# Patient Record
Sex: MALE | Race: WHITE | NOT HISPANIC OR LATINO | URBAN - METROPOLITAN AREA
[De-identification: names, ages, dates, MRNs, and addresses within clinical notes are randomized per-mention and may not be internally consistent; named-entity substitution may affect disease eponyms.]

---

## 2018-08-20 ENCOUNTER — IMPORTED ENCOUNTER (OUTPATIENT)
Dept: URBAN - METROPOLITAN AREA CLINIC 50 | Facility: CLINIC | Age: 53
End: 2018-08-20

## 2019-10-04 ENCOUNTER — IMPORTED ENCOUNTER (OUTPATIENT)
Dept: URBAN - METROPOLITAN AREA CLINIC 50 | Facility: CLINIC | Age: 54
End: 2019-10-04

## 2020-10-15 ENCOUNTER — IMPORTED ENCOUNTER (OUTPATIENT)
Dept: URBAN - METROPOLITAN AREA CLINIC 50 | Facility: CLINIC | Age: 55
End: 2020-10-15

## 2021-04-06 ENCOUNTER — APPOINTMENT (RX ONLY)
Dept: URBAN - METROPOLITAN AREA CLINIC 86 | Facility: CLINIC | Age: 56
Setting detail: DERMATOLOGY
End: 2021-04-06

## 2021-04-06 DIAGNOSIS — L82.1 OTHER SEBORRHEIC KERATOSIS: ICD-10-CM

## 2021-04-06 DIAGNOSIS — L73.8 OTHER SPECIFIED FOLLICULAR DISORDERS: ICD-10-CM

## 2021-04-06 DIAGNOSIS — L57.3 POIKILODERMA OF CIVATTE: ICD-10-CM

## 2021-04-06 PROCEDURE — 99203 OFFICE O/P NEW LOW 30 MIN: CPT

## 2021-04-06 PROCEDURE — ? COUNSELING

## 2021-04-06 ASSESSMENT — LOCATION SIMPLE DESCRIPTION DERM
LOCATION SIMPLE: RIGHT CHEEK
LOCATION SIMPLE: POSTERIOR NECK
LOCATION SIMPLE: CHEST
LOCATION SIMPLE: NECK

## 2021-04-06 ASSESSMENT — LOCATION DETAILED DESCRIPTION DERM
LOCATION DETAILED: RIGHT SUPERIOR CENTRAL MALAR CHEEK
LOCATION DETAILED: RIGHT CENTRAL POSTERIOR NECK
LOCATION DETAILED: MID POSTERIOR NECK
LOCATION DETAILED: LEFT CENTRAL POSTERIOR NECK
LOCATION DETAILED: LEFT MEDIAL SUPERIOR CHEST

## 2021-04-06 ASSESSMENT — LOCATION ZONE DERM
LOCATION ZONE: FACE
LOCATION ZONE: TRUNK
LOCATION ZONE: NECK

## 2021-05-14 ASSESSMENT — VISUAL ACUITY
OD_BAT: 20/20
OS_PH: @ 16 IN
OS_CC: J1+
OS_CC: J1+
OD_CC: J1+@ 16 IN
OS_SC: 20/15
OS_SC: 20/20
OD_CC: J1+
OD_CC: J1+
OD_OTHER: 20/20. 20/30.
OS_BAT: 20/20
OD_SC: 20/20-1
OD_SC: 20/20-1
OS_CC: J1+@ 16 IN
OS_CC: J1+
OD_PH: @ 16 IN
OD_CC: J1+
OS_SC: 20/20-1
OD_SC: 20/25
OS_OTHER: 20/20. 20/30.

## 2021-05-14 ASSESSMENT — TONOMETRY
OD_IOP_MMHG: 17
OD_IOP_MMHG: 16
OD_IOP_MMHG: 12
OS_IOP_MMHG: 12
OS_IOP_MMHG: 16
OS_IOP_MMHG: 17

## 2021-09-22 NOTE — PATIENT DISCUSSION
Mild. Advised patient that she only needs one type of artificial tear and to try Oasis Tears Plus 2x/day. Gave patient a sample of Oasis Tears Plus.

## 2021-09-22 NOTE — PATIENT DISCUSSION
Candidate up to advanced(Synergy tgt Kyara) pending LASIK history and repeat biometry in [facility].

## 2021-10-18 ENCOUNTER — PREPPED CHART (OUTPATIENT)
Dept: URBAN - METROPOLITAN AREA CLINIC 50 | Facility: CLINIC | Age: 56
End: 2021-10-18

## 2021-10-21 ENCOUNTER — ANNUAL COMPREHENSIVE EXAM (OUTPATIENT)
Dept: URBAN - METROPOLITAN AREA CLINIC 50 | Facility: CLINIC | Age: 56
End: 2021-10-21

## 2021-10-21 DIAGNOSIS — H25.811: ICD-10-CM

## 2021-10-21 DIAGNOSIS — H43.813: ICD-10-CM

## 2021-10-21 DIAGNOSIS — H52.4: ICD-10-CM

## 2021-10-21 DIAGNOSIS — H25.12: ICD-10-CM

## 2021-10-21 DIAGNOSIS — H11.153: ICD-10-CM

## 2021-10-21 PROCEDURE — 92014 COMPRE OPH EXAM EST PT 1/>: CPT

## 2021-10-21 PROCEDURE — 92015 DETERMINE REFRACTIVE STATE: CPT

## 2021-10-21 ASSESSMENT — VISUAL ACUITY
OD_GLARE: 20/25
OD_GLARE: 20/30
OS_SC: 20/20
OS_GLARE: 20/30
OD_CC: J1+
OS_CC: J1+
OS_GLARE: 20/25
OU_SC: 20/20
OD_SC: 20/25
OU_CC: J1+
OD_PH: 20/20

## 2021-10-21 ASSESSMENT — KERATOMETRY
OD_K1POWER_DIOPTERS: 43.50
OD_AXISANGLE2_DEGREES: 113
OS_AXISANGLE2_DEGREES: 59
OS_AXISANGLE_DEGREES: 149
OD_K2POWER_DIOPTERS: 44.00
OS_K1POWER_DIOPTERS: 43.00
OD_AXISANGLE_DEGREES: 23
OS_K2POWER_DIOPTERS: 43.75

## 2021-10-21 ASSESSMENT — TONOMETRY
OS_IOP_MMHG: 14
OD_IOP_MMHG: 14

## 2021-10-26 NOTE — PATIENT DISCUSSION
Candidate up to advanced(Synergy tgt Kyara) pending LASIK history and repeat biometry in [facility].

## 2021-10-26 NOTE — PATIENT DISCUSSION
[place] [id number] records form [facility](?) Dr. Carmina Quintero Fax #[phone].

## 2021-12-10 NOTE — PATIENT DISCUSSION
September 18, 2019        Re: Alden Wall  [address] [place] [id number]      To whom it may concern:    Ald[facility] Wall [facility] [place] [facility]  The patient required evaluation for a follow up visit.    Additional Comments or Recommendations: PT has order for left hip pain. Patient demonstrates symptoms consistent with muscle imbalances with possible Gluteus Medius strain vs. SI joint dysfunction vs. Trochanteric bursitis.      Sincerely,      Simona Tejeda, PT    [facility] [place][note 2[place] [facility] [address]  [address] [facility] [address] [place] [id number] [phone]       Post op instructions reviewed with patients including the use of drops. Quality 431: Preventive Care And Screening: Unhealthy Alcohol Use - Screening: Patient screened for unhealthy alcohol use using a single question and scores less than 2 times per year Quality 110: Preventive Care And Screening: Influenza Immunization: Influenza immunization was not ordered or administered, reason not given Detail Level: Detailed

## 2021-12-10 NOTE — PATIENT DISCUSSION
Candidate up to advanced(Synergy tgt Kyara) pending LASIK history and repeat biometry in [facility].

## 2021-12-10 NOTE — PATIENT DISCUSSION
Candidate up to advanced(Synergy tgt Kyara) pending LASIK history and repeat biometry in [facility].

## 2021-12-10 NOTE — PATIENT DISCUSSION
Candidate up to advanced(Synergy tgt Kyara) pending LASIK history and repeat biometry in [facility].

## 2021-12-10 NOTE — PATIENT DISCUSSION
The types of intraocular lenses were reviewed with the patient along with a discussion of their various strengths and weaknesses. Ipledge Number (Optional): [id number] Ipledge Number (Optional): [id number]

## 2021-12-13 NOTE — PATIENT DISCUSSION
Candidate up to advanced(Synergy tgt Kyara) pending LASIK history and repeat biometry in [facility].

## 2021-12-15 NOTE — PATIENT DISCUSSION
Cataract surgery has been performed in the first eye and activities of daily living are still impaired. The patient would like to proceed with cataract surgery in the second eye as scheduled. The patient elects ADV with Synergy OD, goal of Kyara.

## 2021-12-15 NOTE — PATIENT DISCUSSION
Candidate up to advanced(Synergy tgt Kyara) pending LASIK history and repeat biometry in [facility].

## 2021-12-17 NOTE — PATIENT DISCUSSION
Candidate up to advanced(Synergy tgt Kyara) pending LASIK history and repeat biometry in [facility].

## 2021-12-17 NOTE — PATIENT DISCUSSION
Candidate up to advanced(Synergy tgt Kyara) pending LASIK history and repeat biometry in [facility].

## 2021-12-17 NOTE — PATIENT DISCUSSION
Candidate up to advanced(Synergy tgt Kyara) pending LASIK history and repeat biometry in [facility].

## 2021-12-30 NOTE — PATIENT DISCUSSION
2 Week PO: Patient is doing well post-operatively. The importance of post-op drop compliance was emphasized. Drop schedule reviewed with patient. Patient to call if any visual changes or concerns.

## 2025-04-22 ENCOUNTER — APPOINTMENT (OUTPATIENT)
Dept: URBAN - METROPOLITAN AREA CLINIC 86 | Facility: CLINIC | Age: 60
Setting detail: DERMATOLOGY
End: 2025-04-22

## 2025-04-22 DIAGNOSIS — L81.4 OTHER MELANIN HYPERPIGMENTATION: ICD-10-CM | Status: STABLE

## 2025-04-22 DIAGNOSIS — L82.1 OTHER SEBORRHEIC KERATOSIS: ICD-10-CM | Status: STABLE

## 2025-04-22 DIAGNOSIS — L44.8 OTHER SPECIFIED PAPULOSQUAMOUS DISORDERS: ICD-10-CM

## 2025-04-22 DIAGNOSIS — D18.0 HEMANGIOMA: ICD-10-CM | Status: STABLE

## 2025-04-22 DIAGNOSIS — L57.8 OTHER SKIN CHANGES DUE TO CHRONIC EXPOSURE TO NONIONIZING RADIATION: ICD-10-CM | Status: STABLE

## 2025-04-22 DIAGNOSIS — D22 MELANOCYTIC NEVI: ICD-10-CM | Status: STABLE

## 2025-04-22 PROBLEM — D22.5 MELANOCYTIC NEVI OF TRUNK: Status: ACTIVE | Noted: 2025-04-22

## 2025-04-22 PROBLEM — D18.01 HEMANGIOMA OF SKIN AND SUBCUTANEOUS TISSUE: Status: ACTIVE | Noted: 2025-04-22

## 2025-04-22 PROCEDURE — ? COUNSELING

## 2025-04-22 PROCEDURE — ? DIAGNOSIS COMMENT

## 2025-04-22 PROCEDURE — ? FULL BODY SKIN EXAM

## 2025-04-22 PROCEDURE — ? TREATMENT REGIMEN

## 2025-04-22 PROCEDURE — 99203 OFFICE O/P NEW LOW 30 MIN: CPT

## 2025-04-22 ASSESSMENT — LOCATION SIMPLE DESCRIPTION DERM
LOCATION SIMPLE: CHEST
LOCATION SIMPLE: RIGHT LOWER BACK
LOCATION SIMPLE: RIGHT POSTERIOR UPPER ARM
LOCATION SIMPLE: LEFT LOWER BACK

## 2025-04-22 ASSESSMENT — LOCATION DETAILED DESCRIPTION DERM
LOCATION DETAILED: RIGHT INFERIOR LATERAL LOWER BACK
LOCATION DETAILED: RIGHT PROXIMAL POSTERIOR UPPER ARM
LOCATION DETAILED: LEFT INFERIOR LATERAL LOWER BACK
LOCATION DETAILED: LEFT MEDIAL SUPERIOR CHEST
LOCATION DETAILED: RIGHT INFERIOR MEDIAL LOWER BACK

## 2025-04-22 ASSESSMENT — LOCATION ZONE DERM
LOCATION ZONE: TRUNK
LOCATION ZONE: ARM

## 2025-04-22 NOTE — HPI: EVALUATION OF SKIN LESION(S)
What Type Of Note Output Would You Prefer (Optional)?: Standard Output
Hpi Title: Evaluation of Skin Lesions
Family Member: Mother & father